# Patient Record
Sex: MALE
[De-identification: names, ages, dates, MRNs, and addresses within clinical notes are randomized per-mention and may not be internally consistent; named-entity substitution may affect disease eponyms.]

---

## 2018-08-10 PROBLEM — Z00.00 ENCOUNTER FOR PREVENTIVE HEALTH EXAMINATION: Status: ACTIVE | Noted: 2018-08-10

## 2018-09-13 ENCOUNTER — APPOINTMENT (OUTPATIENT)
Dept: OTOLARYNGOLOGY | Facility: CLINIC | Age: 58
End: 2018-09-13

## 2019-04-24 ENCOUNTER — APPOINTMENT (OUTPATIENT)
Dept: OTOLARYNGOLOGY | Facility: CLINIC | Age: 59
End: 2019-04-24
Payer: COMMERCIAL

## 2019-04-24 VITALS
BODY MASS INDEX: 36.41 KG/M2 | HEART RATE: 81 BPM | DIASTOLIC BLOOD PRESSURE: 71 MMHG | WEIGHT: 232 LBS | SYSTOLIC BLOOD PRESSURE: 131 MMHG | HEIGHT: 67 IN

## 2019-04-24 PROCEDURE — 92557 COMPREHENSIVE HEARING TEST: CPT

## 2019-04-24 PROCEDURE — 92567 TYMPANOMETRY: CPT

## 2019-04-24 PROCEDURE — 99204 OFFICE O/P NEW MOD 45 MIN: CPT | Mod: 25

## 2019-04-24 RX ORDER — PREGABALIN 165 MG/1
165 TABLET, FILM COATED, EXTENDED RELEASE ORAL
Refills: 0 | Status: ACTIVE | COMMUNITY

## 2019-04-24 NOTE — ASSESSMENT
[FreeTextEntry1] : 58 y/o male w/ b/l hearing loss assoc with tinnitus for years. H/o multiple ear infections as a child.  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  \par - hearing aid evaluation referral - medicallly cleared for hearing aids\par - d/w patient and they would like to get otology opinion given type B tymps - will refer to Dr. Ag for Otology eval \par - f/up annually

## 2019-04-24 NOTE — CONSULT LETTER
[Dear  ___] : Dear  [unfilled], [Consult Letter:] : I had the pleasure of evaluating your patient, [unfilled]. [Please see my note below.] : Please see my note below. [Consult Closing:] : Thank you very much for allowing me to participate in the care of this patient.  If you have any questions, please do not hesitate to contact me. [FreeTextEntry3] : Sincerely, \par \par Vivien Cardoza MD\par Otolaryngology- Facial Plastics \par [address]\[place] [address]\par (P) - [phone]\par (F) - [phone]

## 2019-04-24 NOTE — HISTORY OF PRESENT ILLNESS
[No] : patient does not have a  history of radiation therapy [Tinnitus] : tinnitus [Hearing Loss] : hearing loss [Recurrent Otitis Media] : recurrent otitis media [Otitis Media with Effusion] : otitis media with effusion [None] : No risk factors have been identified. [de-identified] : 58 y/o male w/ hx of ear infections as a child who came in complaining of b/l hearing loss that has been going on for years but has been progressively getting worse. His wife is speaking on his behalf and stated she does’t recall of any congenital ear abnormalities or ear surgeries. She states they were seen by another ENT doctor 17 years ago who advised him to get hearing aides but he refused. He has intermittent  b/l tinnitus L>R that has been going on for a while. His wife states when he has a cold or becomes ill he can not hear at all. Pt has no ear pain, ear drainage,  vertigo, nasal congestion, nasal discharge, epistaxis, sinus infections, facial pain, facial pressure, throat pain, dysphagia or fevers\par  [Anxiety] : no anxiety [Dizziness] : no dizziness [Headache] : no headache [Otalgia] : no otalgia [Otorrhea] : no otorrhea [Vertigo] : no vertigo [Congenital Ear Malformation] : no congenital ear malformation [Meniere Disease] : no Meniere disease [Otosclerosis] : no otosclerosis [Perilymphatic Fistula] : no perilymphatic fistula [Hypertension] : no hypertension [Loud Noise Exposure] : no history of loud noise exposure [Smoking] : no smoking [Early Onset Hearing Loss] : no early onset hearing loss [Stroke] : no stroke [Facial Pain] : no facial pain [Clear Rhinorrhea] : no clear rhinorrhea [Facial Pressure] : no facial pressure [Purulent Rhinorrhea] : no purulent rhinorrhea [Nasal Congestion] : no nasal congestion [Postnasal Drainage] : no postnasal drainage [Ear Pain] : no ear pain [Ear Pressure] : no ear pressure [Diplopia] : no diplopia [Ear Fullness] : no ear fullness [Allergic Rhinitis] : no allergic rhinitis [Environmental Allergies] : no environmental allergies [Seasonal Allergies] : no seasonal allergies [Environmental Allergens] : no environmental allergens [Adenoidectomy] : no adenoidectomy [Allergies] : no allergies [Neck Mass] : no neck mass [Asthma] : no asthma [Neck Pain] : no neck pain [Chills] : no chills [Cold Intolerance] : no cold intolerance [Cough] : no cough [Fatigue] : no fatigue [Heat Intolerance] : no heat intolerance [Hyperthyroidism] : no hyperthyroidism [Sialadenitis] : no sialadenitis [Hodgkin Disease] : no hodgkin disease [Non-Hodgkin Lymphoma] : no non-hodgkin lymphoma [Tobacco Use] : no tobacco use [Alcohol Use] : no alcohol use [Graves Disease] : no graves disease [Thyroid Cancer] : no thyroid cancer

## 2019-04-24 NOTE — REASON FOR VISIT
[Initial Evaluation] : an initial evaluation for [Spouse] : spouse [Hearing Loss] : hearing loss [Tinnitus] : tinnitus

## 2019-04-24 NOTE — DATA REVIEWED
[de-identified] : 4/24/19: moderate to moderately severe SNHL AU (<5dB conductive component), type B tymps AU, ECV 1.7-1.8\par WRS: 48-52% at 95dB suspect language barrier

## 2019-04-24 NOTE — PHYSICAL EXAM
[Midline] : trachea located in midline position [Normal] : no rashes [de-identified] : tympanosclerosis b/l, opaque membrane, unable to see any fluid.

## 2019-05-20 ENCOUNTER — APPOINTMENT (OUTPATIENT)
Dept: PHARMACY | Facility: CLINIC | Age: 59
End: 2019-05-20
Payer: SELF-PAY

## 2019-05-20 PROCEDURE — V5010 ASSESSMENT FOR HEARING AID: CPT | Mod: NC

## 2019-06-05 ENCOUNTER — APPOINTMENT (OUTPATIENT)
Dept: OTOLARYNGOLOGY | Facility: CLINIC | Age: 59
End: 2019-06-05
Payer: COMMERCIAL

## 2019-06-05 VITALS
SYSTOLIC BLOOD PRESSURE: 120 MMHG | WEIGHT: 232 LBS | BODY MASS INDEX: 36.41 KG/M2 | HEART RATE: 86 BPM | HEIGHT: 67 IN | DIASTOLIC BLOOD PRESSURE: 72 MMHG

## 2019-06-05 DIAGNOSIS — H90.3 SENSORINEURAL HEARING LOSS, BILATERAL: ICD-10-CM

## 2019-06-05 PROCEDURE — 99213 OFFICE O/P EST LOW 20 MIN: CPT

## 2019-06-05 NOTE — ASSESSMENT
[FreeTextEntry1] : 60 y/o male w/ b/l hearing loss assoc with tinnitus for years \par \par Bilateral sensorineural hearing loss-cleared for hearing aids\par \par Will f/u as needed \par

## 2019-06-05 NOTE — CONSULT LETTER
[FreeTextEntry3] : Sincerely, \par \par Vivien Cardoza MD\par Otolaryngology- Facial Plastics \par [address]\[place] [address]\par (P) - [phone]\par (F) - [phone]

## 2019-06-05 NOTE — PROCEDURE
[Same] : same as the Pre Op Dx. [Risk and Benefits Discussed] : The purpose, risks, discomforts, benefits and alternatives of the procedure have been explained to the patient including no treatment. [Other ___] : [unfilled] [] : Binocular Microscopy [FreeTextEntry1] : hearing loss [FreeTextEntry6] : bilat tympanosclerosis\par wax removed\par the microscope was necessary for illumination and magnification\par

## 2019-06-05 NOTE — PHYSICAL EXAM
[Midline] : trachea located in midline position [Normal] : no rashes [de-identified] : tympanosclerosis b/l, opaque membrane

## 2019-06-05 NOTE — HISTORY OF PRESENT ILLNESS
[No] : patient does not have a  history of radiation therapy [Tinnitus] : tinnitus [Hearing Loss] : hearing loss [Recurrent Otitis Media] : recurrent otitis media [Otitis Media with Effusion] : otitis media with effusion [None] : No risk factors have been identified. [de-identified] : 60 y/o male w/ hx of ear infections as a child who came in complaining of b/l hearing loss that has been going on for years but has been progressively getting worse who is here for a second opinion with Dr. Ag. He has intermittent b/l tinnitus L>R that has been going on for a while. Pt has no ear pain, ear drainage,  vertigo, nasal congestion, nasal discharge, epistaxis, sinus infections, facial pain, facial pressure, throat pain, dysphagia or fevers\par  [Anxiety] : no anxiety [Dizziness] : no dizziness [Headache] : no headache [Otalgia] : no otalgia [Otorrhea] : no otorrhea [Vertigo] : no vertigo [Congenital Ear Malformation] : no congenital ear malformation [Meniere Disease] : no Meniere disease [Otosclerosis] : no otosclerosis [Hypertension] : no hypertension [Perilymphatic Fistula] : no perilymphatic fistula [Loud Noise Exposure] : no history of loud noise exposure [Early Onset Hearing Loss] : no early onset hearing loss [Smoking] : no smoking [Stroke] : no stroke [Clear Rhinorrhea] : no clear rhinorrhea [Facial Pain] : no facial pain [Nasal Congestion] : no nasal congestion [Purulent Rhinorrhea] : no purulent rhinorrhea [Facial Pressure] : no facial pressure [Postnasal Drainage] : no postnasal drainage [Ear Pain] : no ear pain [Ear Pressure] : no ear pressure [Diplopia] : no diplopia [Ear Fullness] : no ear fullness [Environmental Allergies] : no environmental allergies [Allergic Rhinitis] : no allergic rhinitis [Seasonal Allergies] : no seasonal allergies [Environmental Allergens] : no environmental allergens [Adenoidectomy] : no adenoidectomy [Allergies] : no allergies [Neck Mass] : no neck mass [Asthma] : no asthma [Chills] : no chills [Cold Intolerance] : no cold intolerance [Neck Pain] : no neck pain [Fatigue] : no fatigue [Cough] : no cough [Heat Intolerance] : no heat intolerance [Hyperthyroidism] : no hyperthyroidism [Sialadenitis] : no sialadenitis [Non-Hodgkin Lymphoma] : no non-hodgkin lymphoma [Tobacco Use] : no tobacco use [Hodgkin Disease] : no hodgkin disease [Alcohol Use] : no alcohol use [Graves Disease] : no graves disease [Thyroid Cancer] : no thyroid cancer

## 2019-06-05 NOTE — REASON FOR VISIT
[Hearing Loss] : hearing loss [Tinnitus] : tinnitus [Spouse] : spouse [Subsequent Evaluation] : a subsequent evaluation for